# Patient Record
Sex: MALE | Race: OTHER | HISPANIC OR LATINO | Employment: OTHER | URBAN - METROPOLITAN AREA
[De-identification: names, ages, dates, MRNs, and addresses within clinical notes are randomized per-mention and may not be internally consistent; named-entity substitution may affect disease eponyms.]

---

## 2023-06-09 ENCOUNTER — HOSPITAL ENCOUNTER (EMERGENCY)
Facility: HOSPITAL | Age: 28
Discharge: HOME | End: 2023-06-09
Attending: EMERGENCY MEDICINE
Payer: OTHER GOVERNMENT

## 2023-06-09 VITALS
TEMPERATURE: 97.4 F | OXYGEN SATURATION: 98 % | HEART RATE: 81 BPM | SYSTOLIC BLOOD PRESSURE: 104 MMHG | WEIGHT: 152.34 LBS | BODY MASS INDEX: 25.38 KG/M2 | DIASTOLIC BLOOD PRESSURE: 80 MMHG | HEIGHT: 65 IN | RESPIRATION RATE: 18 BRPM

## 2023-06-09 DIAGNOSIS — L08.9 SKIN INFECTION: Primary | ICD-10-CM

## 2023-06-09 PROCEDURE — 99282 EMERGENCY DEPT VISIT SF MDM: CPT

## 2023-06-09 RX ORDER — SULFAMETHOXAZOLE AND TRIMETHOPRIM 800; 160 MG/1; MG/1
1 TABLET ORAL 2 TIMES DAILY
Qty: 14 TABLET | Refills: 0 | Status: SHIPPED | OUTPATIENT
Start: 2023-06-09 | End: 2023-06-16

## 2023-06-09 RX ORDER — IBUPROFEN 800 MG/1
800 TABLET ORAL EVERY 8 HOURS PRN
Qty: 15 TABLET | Refills: 0 | Status: SHIPPED | OUTPATIENT
Start: 2023-06-09 | End: 2023-06-14

## 2023-06-09 NOTE — DISCHARGE INSTRUCTIONS
Do warm wet compresses to the areas several times per day. I have written for an antibiotic and anti-inflammatory medicine. Please follow-up with your primary care provider if not starting to improve (though will not be completely resolved) in 3 days. Return if they become significantly more enlarged, you have high fevers, nausea, vomiting, or other worsening or concerning symptoms.

## 2023-06-09 NOTE — Clinical Note
[facility] EMERGENCY ROOM  [address]  [place] [address]  Phone: [phone]    Italo Hernandez was seen and treated in our emergency department on 6/9/2023.  He may return to work on 06/12/2023.         Thank you for choosing [facility].    Kaley Hernandez PA-C

## 2023-06-09 NOTE — ED PROVIDER NOTES
"Time: 6:14 PM EDT  Date of encounter:  6/9/2023  Independent Historian/Clinical History and Information was obtained by:   Patient  Chief Complaint: abscess    History is limited by: N/A    History of Present Illness:  Patient is a 27 y.o. year old male who presents to the emergency department for evaluation of skin lesions. Started noticing 6/3 after travelling to [place]. Has been squeezing and draining with pustular drainage. Now has a couple lesions to b/l axilla and 2 on abdomen. Had one in right nare. Denies fevers, nausea, vomiting, diarrhea. Denies insect bites.     HPI    Patient Care Team  Primary Care Provider: Vignesh Calvo MD    Past Medical History:     Allergies   Allergen Reactions    Shellfish-Derived Products Unknown - High Severity     History reviewed. No pertinent past medical history.  History reviewed. No pertinent surgical history.  History reviewed. No pertinent family history.    Home Medications:  Prior to Admission medications    Not on File        Social History:   Social History     Tobacco Use    Smoking status: Never    Smokeless tobacco: Never   Vaping Use    Vaping Use: Every day   Substance Use Topics    Alcohol use: Yes     Comment: Occasional    Drug use: Never         Review of Systems:  Review of Systems   Constitutional:  Negative for chills and fever.   HENT:  Negative for congestion and sore throat.    Respiratory:  Negative for cough and shortness of breath.    Gastrointestinal:  Negative for abdominal pain, diarrhea, nausea and vomiting.   Genitourinary:  Negative for dysuria.   Skin:  Positive for rash.   Neurological:  Negative for headaches.   All other systems reviewed and are negative.     Physical Exam:  /80 (BP Location: Right arm, Patient Position: Sitting)   Pulse 81   Temp 97.4 °F (36.3 °C) (Oral)   Resp 18   Ht 165.1 cm (65\")   Wt 69.1 kg (152 lb 5.4 oz)   SpO2 98%   BMI 25.35 kg/m²     Physical Exam  Vitals and nursing note reviewed. "   Constitutional:       Appearance: Normal appearance. He is not ill-appearing or toxic-appearing.   HENT:      Head: Normocephalic.      Nose: Nose normal.      Mouth/Throat:      Mouth: Mucous membranes are moist.   Eyes:      Conjunctiva/sclera: Conjunctivae normal.   Cardiovascular:      Rate and Rhythm: Normal rate and regular rhythm.   Pulmonary:      Effort: Pulmonary effort is normal.      Breath sounds: Normal breath sounds.   Musculoskeletal:         General: Normal range of motion.      Cervical back: Normal range of motion.   Skin:     General: Skin is warm and dry.      Capillary Refill: Capillary refill takes less than 2 seconds.      Comments: 2 small areas of induration to b/l axilla without fluctuance. 1 small area on abdomen.    Neurological:      Mental Status: He is alert.                Procedures:  Procedures      Medical Decision Making:      Comorbidities that affect care:    None    External Notes reviewed:    None      The following orders were placed and all results were independently analyzed by me:  No orders of the defined types were placed in this encounter.      Medications Given in the Emergency Department:  Medications - No data to display     ED Course:         Labs:    Lab Results (last 24 hours)       ** No results found for the last 24 hours. **             Imaging:    No Radiology Exams Resulted Within Past 24 Hours      Differential Diagnosis and Discussion:    Abscess: Differential diagnosis for an abscess includes but is not limited to bacterial or fungal infections, foreign body reactions, malignancies, and autoimmune or inflammatory conditions.        MDM           Patient Care Considerations:    LABS: I considered ordering labs, however has been getting pustular drainage at home. Discussed starting abx and if not starting to improve in 72 hours f/u with PCP for further evaluation      Consultants/Shared Management Plan:        Social Determinants of Health:    Patient is  independent, reliable, and has access to care.       Disposition and Care Coordination:    Discharged: The patient is suitable and stable for discharge with no need for consideration of observation or admission.    Hx and PE consistent with small abscess without indication for I&D. Differential includes adenopathy. Shared decision made to initiate abx and f/u if not starting  to improve in 3 days (will not be completely resolved) for further evaluation.     I have explained the patient´s condition, diagnoses and treatment plan based on the information available to me at this time. I have answered questions and addressed any concerns. The patient has a good  understanding of the patient´s diagnosis, condition, and treatment plan as can be expected at this point. The vital signs have been stable. The patient´s condition is stable and appropriate for discharge from the emergency department.      The patient will pursue further outpatient evaluation with the primary care physician or other designated or consulting physician as outlined in the discharge instructions. They are agreeable to this plan of care and follow-up instructions have been explained in detail. The patient has received these instructions in written format and have expressed an understanding of the discharge instructions. The patient is aware that any significant change in condition or worsening of symptoms should prompt an immediate return to this or the closest emergency department or call to 911.  I have explained discharge medications and the need for follow up with the patient/caretakers. This was also printed in the discharge instructions. Patient was discharged with the following medications and follow up:      Medication List        New Prescriptions      ibuprofen 800 MG tablet  Commonly known as: ADVIL,MOTRIN  Take 1 tablet by mouth Every 8 (Eight) Hours As Needed for Mild Pain for up to 5 days.     sulfamethoxazole-trimethoprim 800-160 MG per  tablet  Commonly known as: BACTRIM DS,SEPTRA DS  Take 1 tablet by mouth 2 (Two) Times a Day for 7 days.               Where to Get Your Medications        These medications were sent to [id number] - [place] - [address] AT [facility] [address] & [place] - [phone]  - [phone]   [address] [place] [address]      Phone: [phone]   ibuprofen 800 MG tablet  sulfamethoxazole-trimethoprim 800-160 MG per tablet      Vignesh Calvo MD  [address] [facility] [address] [facility] [address] [phone]    In 3 days  if symptoms not starting to improve    [facility] EMERGENCY ROOM  [address] [address] [phone]    If symptoms worsen       Final diagnoses:   Skin infection        ED Disposition       ED Disposition   Discharge    Condition   Stable    Comment   --               This medical record created using voice recognition software.             Kaley Hernandez PA-C  06/10/23 1044

## 2023-06-09 NOTE — Clinical Note
[facility] EMERGENCY ROOM  [address]  [place] [address]  Phone: [phone]    Italo Hernandez was seen and treated in our emergency department on 6/9/2023.  He may return to work on 06/12/2023.         Thank you for choosing [facility].    Kaley Hernandez PA-C

## 2024-03-16 ENCOUNTER — APPOINTMENT (OUTPATIENT)
Dept: GENERAL RADIOLOGY | Facility: HOSPITAL | Age: 29
End: 2024-03-16
Payer: OTHER GOVERNMENT

## 2024-03-16 ENCOUNTER — HOSPITAL ENCOUNTER (EMERGENCY)
Facility: HOSPITAL | Age: 29
Discharge: HOME | End: 2024-03-16
Attending: EMERGENCY MEDICINE
Payer: OTHER GOVERNMENT

## 2024-03-16 VITALS
DIASTOLIC BLOOD PRESSURE: 70 MMHG | BODY MASS INDEX: 24.98 KG/M2 | HEIGHT: 67 IN | TEMPERATURE: 98.4 F | WEIGHT: 159.17 LBS | SYSTOLIC BLOOD PRESSURE: 122 MMHG | OXYGEN SATURATION: 97 % | RESPIRATION RATE: 16 BRPM | HEART RATE: 65 BPM

## 2024-03-16 DIAGNOSIS — M25.462 EFFUSION OF LEFT KNEE: ICD-10-CM

## 2024-03-16 DIAGNOSIS — S86.912A KNEE STRAIN, LEFT, INITIAL ENCOUNTER: Primary | ICD-10-CM

## 2024-03-16 PROCEDURE — 73562 X-RAY EXAM OF KNEE 3: CPT

## 2024-03-16 PROCEDURE — 99283 EMERGENCY DEPT VISIT LOW MDM: CPT

## 2024-03-16 RX ORDER — PREDNISONE 20 MG/1
20 TABLET ORAL DAILY
Qty: 5 TABLET | Refills: 0 | Status: SHIPPED | OUTPATIENT
Start: 2024-03-16 | End: 2024-03-21

## 2024-03-16 RX ORDER — IBUPROFEN 800 MG/1
800 TABLET ORAL EVERY 6 HOURS PRN
Qty: 20 TABLET | Refills: 0 | Status: SHIPPED | OUTPATIENT
Start: 2024-03-16

## 2024-03-16 RX ORDER — IBUPROFEN 400 MG/1
800 TABLET ORAL ONCE
Status: COMPLETED | OUTPATIENT
Start: 2024-03-16 | End: 2024-03-16

## 2024-03-16 RX ADMIN — IBUPROFEN 800 MG: 400 TABLET, FILM COATED ORAL at 16:17

## 2024-03-16 NOTE — DISCHARGE INSTRUCTIONS
Please follow with your primary care provider as needed.  If pain does not resolve you may need to seek further testing.  Rest ice and elevate the affected extremity.  Leave ice on no more than 15 minutes at a time.

## 2024-03-16 NOTE — Clinical Note
[facility] EMERGENCY ROOM  [address]  [place] [address]  Phone: [phone]  Fax: [phone]    Italo Hernandez was seen and treated in our emergency department on 3/16/2024.  He may return to work on 03/19/2024.         Thank you for choosing [facility].    Tsering Tinoe, APRN

## 2024-03-16 NOTE — ED PROVIDER NOTES
Time: 3:58 PM EDT  Date of encounter:  3/16/2024  Independent Historian/Clinical History and Information was obtained by:   Patient    History is limited by: N/A    Chief Complaint: Extremity pain      History of Present Illness:  Patient is a 28 y.o. year old male who presents to the emergency department for evaluation of pain to the left knee after he felt a pop while he was running yesterday.  Patient reports he has tried a knee brace, ice, and IcyHot ointment without relief.  Denies previous injury to this knee.  Reports he is able to walk on the extremity but it is painful.  Denies any fevers, chills, body aches, chest pain, shortness of breath.    HPI    Patient Care Team  Primary Care Provider: Vignesh Calvo MD    Past Medical History:     Allergies   Allergen Reactions    Shellfish-Derived Products Unknown - High Severity     History reviewed. No pertinent past medical history.  History reviewed. No pertinent surgical history.  History reviewed. No pertinent family history.    Home Medications:  Prior to Admission medications    Not on File        Social History:   Social History     Tobacco Use    Smoking status: Never    Smokeless tobacco: Never   Vaping Use    Vaping status: Every Day    Substances: Nicotine    Devices: Disposable   Substance Use Topics    Alcohol use: Yes     Comment: Occasional    Drug use: Never         Review of Systems:  Review of Systems   Constitutional:  Negative for chills, fatigue and fever.   HENT:  Negative for ear pain, rhinorrhea and sore throat.    Eyes:  Negative for visual disturbance.   Respiratory:  Negative for cough and shortness of breath.    Cardiovascular:  Negative for chest pain.   Gastrointestinal:  Negative for abdominal pain, diarrhea and vomiting.   Genitourinary:  Negative for difficulty urinating.   Musculoskeletal:  Positive for arthralgias. Negative for back pain and myalgias.   Skin:  Negative for rash.   Neurological:  Negative for  "light-headedness and headaches.   Hematological:  Negative for adenopathy.   Psychiatric/Behavioral: Negative.          Physical Exam:  /76 (BP Location: Left arm, Patient Position: Sitting)   Pulse 67   Temp 98.1 °F (36.7 °C) (Oral)   Resp 16   Ht 170.2 cm (67\")   Wt 72.2 kg (159 lb 2.8 oz)   SpO2 100%   BMI 24.93 kg/m²     Physical Exam  Vitals and nursing note reviewed.   Constitutional:       General: He is not in acute distress.     Appearance: Normal appearance. He is not toxic-appearing.   HENT:      Head: Normocephalic and atraumatic.      Nose: Nose normal.      Mouth/Throat:      Mouth: Mucous membranes are moist.   Eyes:      Conjunctiva/sclera: Conjunctivae normal.   Cardiovascular:      Rate and Rhythm: Normal rate.      Pulses: Normal pulses.   Pulmonary:      Effort: Pulmonary effort is normal.   Abdominal:      Palpations: Abdomen is soft.      Tenderness: There is no abdominal tenderness.   Musculoskeletal:         General: No swelling, tenderness (Reports pain with movement but denies tenderness to palpation.), deformity or signs of injury. Normal range of motion.      Cervical back: Normal range of motion.   Skin:     General: Skin is warm and dry.   Neurological:      General: No focal deficit present.      Mental Status: He is alert and oriented to person, place, and time.   Psychiatric:         Mood and Affect: Mood normal.         Behavior: Behavior normal.         Thought Content: Thought content normal.         Judgment: Judgment normal.               Procedures:  Procedures      Medical Decision Making:      Comorbidities that affect care:    None    External Notes reviewed:    None      The following orders were placed and all results were independently analyzed by me:  Orders Placed This Encounter   Procedures    XR Knee 3 View Left       Medications Given in the Emergency Department:  Medications   ibuprofen (ADVIL,MOTRIN) tablet 800 mg (has no administration in time range) "        ED Course:         Labs:    Lab Results (last 24 hours)       ** No results found for the last 24 hours. **             Imaging:    XR Knee 3 View Left    Result Date: 3/16/2024  PROCEDURE: XR KNEE 3 VW LEFT  COMPARISON: None  INDICATIONS: LEFT KNEE PAIN; INJURY WHEN RUNNING YESTERDAY  FINDINGS:  No acute fracture or dislocation is identified.  Joint spaces appear intact.  There is a suprapatellar joint effusion.        1. Suprapatellar joint effusion, which can be seen with internal derangement. 2. No acute fracture identified.      MOSES FINNEGAN MD       Electronically Signed and Approved By: MOSES FINNEGAN MD on 3/16/2024 at 15:54                Differential Diagnosis and Discussion:    Extremity Pain: Differential diagnosis includes but is not limited to soft tissue sprain, tendonitis, tendon injury, dislocation, fracture, deep vein thrombosis, arterial insufficiency, osteoarthritis, bursitis, and ligamentous damage.    All X-rays impressions were independently interpreted by me.    MDM  Number of Diagnoses or Management Options  Effusion of left knee  Knee strain, left, initial encounter  Diagnosis management comments: I have explained the patient´s condition, diagnoses and treatment plan based on the information available to me at this time. I have answered questions and addressed any concerns. The patient has a good  understanding of the patient´s diagnosis, condition, and treatment plan as can be expected at this point. The vital signs have been stable. The patient´s condition is stable and appropriate for discharge from the emergency department.      The patient will pursue further outpatient evaluation with the primary care physician or other designated or consulting physician as outlined in the discharge instructions. They are agreeable to this plan of care and follow-up instructions have been explained in detail. The patient has received these instructions in written format and has expressed  an understanding of the discharge instructions. The patient is aware that any significant change in condition or worsening of symptoms should prompt an immediate return to this or the closest emergency department or call to 911.         Amount and/or Complexity of Data Reviewed  Tests in the radiology section of CPT®: reviewed         Patient Care Considerations:    NARCOTICS: I considered prescribing opiate pain medication as an outpatient, however pain is managed without the use of narcotics in the ED.      Consultants/Shared Management Plan:    None    Social Determinants of Health:    Patient is independent, reliable, and has access to care.       Disposition and Care Coordination:    Discharged: The patient is suitable and stable for discharge with no need for consideration of admission.    I have explained the patient´s condition, diagnoses and treatment plan based on the information available to me at this time. I have answered questions and addressed any concerns. The patient has a good  understanding of the patient´s diagnosis, condition, and treatment plan as can be expected at this point. The vital signs have been stable. The patient´s condition is stable and appropriate for discharge from the emergency department.      The patient will pursue further outpatient evaluation with the primary care physician or other designated or consulting physician as outlined in the discharge instructions. They are agreeable to this plan of care and follow-up instructions have been explained in detail. The patient has received these instructions in written format and has expressed an understanding of the discharge instructions. The patient is aware that any significant change in condition or worsening of symptoms should prompt an immediate return to this or the closest emergency department or call to 911.  I have explained discharge medications and the need for follow up with the patient/caretakers. This was also printed  in the discharge instructions. Patient was discharged with the following medications and follow up:      Medication List        New Prescriptions      ibuprofen 800 MG tablet  Commonly known as: ADVIL,MOTRIN  Take 1 tablet by mouth Every 6 (Six) Hours As Needed for Moderate Pain.     predniSONE 20 MG tablet  Commonly known as: DELTASONE  Take 1 tablet by mouth Daily for 5 days.               Where to Get Your Medications        These medications were sent to [facility][id number] - [place], [place] - [address] AT [facility] [address] & [place] - [phone]  - [phone]   [address], [facility] [address]      Phone: [phone]   ibuprofen 800 MG tablet  predniSONE 20 MG tablet      Vignesh Calvo MD  [address] [facility] [address] [phone]    Go to   As needed       Final diagnoses:   Knee strain, left, initial encounter   Effusion of left knee        ED Disposition       ED Disposition   Discharge    Condition   Stable    Comment   --               This medical record created using voice recognition software.             Tsering Tineo, VERONICA  03/16/24 1610       Tsernig Tineo, VERONICA  03/16/24 1612

## 2025-02-12 PROCEDURE — 87186 SC STD MICRODIL/AGAR DIL: CPT | Performed by: FAMILY MEDICINE

## 2025-02-12 PROCEDURE — 87070 CULTURE OTHR SPECIMN AEROBIC: CPT | Performed by: FAMILY MEDICINE

## 2025-02-12 PROCEDURE — 87205 SMEAR GRAM STAIN: CPT | Performed by: FAMILY MEDICINE

## 2025-02-12 PROCEDURE — 87147 CULTURE TYPE IMMUNOLOGIC: CPT | Performed by: FAMILY MEDICINE
